# Patient Record
Sex: FEMALE | Race: WHITE | Employment: FULL TIME
[De-identification: names, ages, dates, MRNs, and addresses within clinical notes are randomized per-mention and may not be internally consistent; named-entity substitution may affect disease eponyms.]

---

## 2019-10-03 ENCOUNTER — RECORDS - HEALTHEAST (OUTPATIENT)
Dept: ADMINISTRATIVE | Facility: OTHER | Age: 29
End: 2019-10-03

## 2019-10-03 ENCOUNTER — OFFICE VISIT (OUTPATIENT)
Dept: FAMILY MEDICINE | Facility: CLINIC | Age: 29
End: 2019-10-03
Payer: COMMERCIAL

## 2019-10-03 VITALS
WEIGHT: 117.9 LBS | SYSTOLIC BLOOD PRESSURE: 120 MMHG | HEART RATE: 91 BPM | DIASTOLIC BLOOD PRESSURE: 68 MMHG | HEIGHT: 60 IN | BODY MASS INDEX: 23.15 KG/M2 | OXYGEN SATURATION: 100 % | TEMPERATURE: 98.4 F

## 2019-10-03 DIAGNOSIS — Z00.00 ROUTINE GENERAL MEDICAL EXAMINATION AT A HEALTH CARE FACILITY: Primary | ICD-10-CM

## 2019-10-03 DIAGNOSIS — Z12.4 SCREENING FOR MALIGNANT NEOPLASM OF CERVIX: ICD-10-CM

## 2019-10-03 DIAGNOSIS — L90.0 LICHEN SCLEROSUS: ICD-10-CM

## 2019-10-03 PROCEDURE — G0145 SCR C/V CYTO,THINLAYER,RESCR: HCPCS | Performed by: PHYSICIAN ASSISTANT

## 2019-10-03 PROCEDURE — 99385 PREV VISIT NEW AGE 18-39: CPT | Performed by: PHYSICIAN ASSISTANT

## 2019-10-03 RX ORDER — TRIAMCINOLONE ACETONIDE 1 MG/G
OINTMENT TOPICAL PRN
Qty: 30 G | Refills: 5 | Status: SHIPPED | OUTPATIENT
Start: 2019-10-03

## 2019-10-03 ASSESSMENT — ENCOUNTER SYMPTOMS
CHILLS: 0
JOINT SWELLING: 0
FEVER: 0
DYSURIA: 0
ARTHRALGIAS: 0
HEMATOCHEZIA: 0
PARESTHESIAS: 0
DIZZINESS: 0
FREQUENCY: 0
PALPITATIONS: 0
ABDOMINAL PAIN: 0
DIARRHEA: 0
HEARTBURN: 0
SHORTNESS OF BREATH: 0
EYE PAIN: 0
CONSTIPATION: 0
BREAST MASS: 0
COUGH: 0
NERVOUS/ANXIOUS: 0
MYALGIAS: 0
SORE THROAT: 0
HEADACHES: 0
NAUSEA: 0
WEAKNESS: 0
HEMATURIA: 0

## 2019-10-03 ASSESSMENT — MIFFLIN-ST. JEOR: SCORE: 1203.16

## 2019-10-03 NOTE — LETTER
October 10, 2019      Aisha Ramirez  [address] [address]    Dear Ms.James,      I am happy to inform you that your recent cervical cancer screening test (PAP smear) was normal.      Preventative screenings such as this help to ensure your health for years to come. You should repeat a pap smear in 3 years, unless otherwise directed.      You will still need to return to the clinic every year for your annual exam and other preventive tests.     If you have additional questions regarding this result, please call our registered nurse, Sharon at [phone].      Sincerely,      Maryellen Elias PA-C/yanelis

## 2019-10-03 NOTE — PROGRESS NOTES
SUBJECTIVE:   CC: Aisha Ramirez is an 28 year old woman who presents for preventive health visit.     Healthy Habits:     Getting at least 3 servings of Calcium per day:  Yes    Bi-annual eye exam:  Yes    Dental care twice a year:  NO    Sleep apnea or symptoms of sleep apnea:  None    Diet:  Regular (no restrictions)    Frequency of exercise:  2-3 days/week    Duration of exercise:  15-30 minutes    Taking medications regularly:  Yes    Barriers to taking medications:  None    Medication side effects:  None    PHQ-2 Total Score: 0    Additional concerns today:  No    Patient is a new patient.   Has lichen sclerosis using topical steroids. Doing well with this.   Mirena- due for replacement 2022    Patient gets flu shot through work.     Today's PHQ-2 Score:   PHQ-2 ( 1999 Pf[employer]) 10/3/2019   Q1: Little interest or pleasure in doing things 0   Q2: Feeling down, depressed or hopeless 0   PHQ-2 Score 0   Q1: Little interest or pleasure in doing things Not at all   Q2: Feeling down, depressed or hopeless Not at all   PHQ-2 Score 0       Abuse: Current or Past(Physical, Sexual or Emotional)- No  Do you feel safe in your environment? Yes    Social History     Tobacco Use     Smoking status: Never Smoker     Smokeless tobacco: Never Used   Substance Use Topics     Alcohol use: Yes     Alcohol/week: 0.0 standard drinks         Alcohol Use 10/3/2019   Prescreen: >3 drinks/day or >7 drinks/week? No       Reviewed orders with patient.  Reviewed health maintenance and updated orders accordingly - Yes  Labs reviewed in [facility]    Mammogram not appropriate for this patient based on age.    Pertinent mammograms are reviewed under the imaging tab.  History of abnormal Pap smear: NO - age 21-29 PAP every 3 years recommended     Reviewed and updated as needed this visit by clinical staff  Tobacco  Allergies  Meds  Problems  Med Hx  Surg Hx  Fam Hx  Soc Hx          Reviewed and updated as needed this visit by  "Provider  Tobacco  Allergies  Meds  Problems  Med Hx  Surg Hx  Fam Hx            Review of Systems   Constitutional: Negative for chills and fever.   HENT: Negative for congestion, ear pain, hearing loss and sore throat.    Eyes: Negative for pain and visual disturbance.   Respiratory: Negative for cough and shortness of breath.    Cardiovascular: Negative for chest pain, palpitations and peripheral edema.   Gastrointestinal: Negative for abdominal pain, constipation, diarrhea, heartburn, hematochezia and nausea.   Breasts:  Positive for tenderness. Negative for breast mass and discharge.   Genitourinary: Negative for dysuria, frequency, genital sores, hematuria, pelvic pain, urgency and vaginal discharge.   Musculoskeletal: Negative for arthralgias, joint swelling and myalgias.   Skin: Negative for rash.   Neurological: Negative for dizziness, weakness, headaches and paresthesias.   Psychiatric/Behavioral: Negative for mood changes. The patient is not nervous/anxious.         OBJECTIVE:   /68 (BP Location: Left arm, Patient Position: Chair, Cuff Size: Adult Regular)   Pulse 91   Temp 98.4  F (36.9  C) (Oral)   Ht 1.535 m (5' 0.43\")   Wt 53.5 kg (117 lb 14.4 oz)   SpO2 100%   Breastfeeding? No   BMI 22.70 kg/m    Physical Exam  GENERAL: healthy, alert and no distress  EYES: Eyes grossly normal to inspection, PERRL and conjunctivae and sclerae normal  HENT: ear canals and TM's normal, nose and mouth without ulcers or lesions  NECK: no adenopathy, no asymmetry, masses, or scars and thyroid normal to palpation  RESP: lungs clear to auscultation - no rales, rhonchi or wheezes  BREAST: normal without masses, tenderness or nipple discharge and no palpable axillary masses or adenopathy  CV: regular rate and rhythm, normal S1 S2, no S3 or S4, no murmur, click or rub, no peripheral edema and peripheral pulses strong  ABDOMEN: soft, nontender, no hepatosplenomegaly, no masses and bowel sounds normal   " "(female): normal female external genitalia, normal urethral meatus, vaginal mucosa pink, moist, well rugated, and normal cervix/adnexa/uterus without masses or discharge- IUD strings in place.   MS: no gross musculoskeletal defects noted, no edema  SKIN: no suspicious lesions or rashes  NEURO: Normal strength and tone, mentation intact and speech normal  PSYCH: mentation appears normal, affect normal/bright    Diagnostic Test Results:  none     ASSESSMENT/PLAN:       ICD-10-CM    1. Routine general medical examination at a health care facility Z00.00 Pap imaged thin layer screen reflex to HPV if ASCUS - recommend age 25 - 29   2. Screening for malignant neoplasm of cervix Z12.4 Pap imaged thin layer screen reflex to HPV if ASCUS - recommend age 25 - 29   3. Lichen sclerosus L90.0 triamcinolone (KENALOG) 0.1 % external ointment       COUNSELING:  Reviewed preventive health counseling, as reflected in patient instructions       Regular exercise       Healthy diet/nutrition       Contraception    Estimated body mass index is 22.7 kg/m  as calculated from the following:    Height as of this encounter: 1.535 m (5' 0.43\").    Weight as of this encounter: 53.5 kg (117 lb 14.4 oz).    Weight management plan: Discussed healthy diet and exercise guidelines     reports that she has never smoked. She has never used smokeless tobacco.      Counseling Resources:  ATP IV Guidelines  Pooled Cohorts Equation Calculator  Breast Cancer Risk Calculator  FRAX Risk Assessment  ICSI Preventive Guidelines  Dietary Guidelines for Americans, 2010  USDA's MyPlate  ASA Prophylaxis  Lung CA Screening    Maryellen Elias PA-C  [facility]    The above chart was reviewed.  The patient was not examined by me.  Duy Moore MD (supervising physician)  Family Medicine  [place] [facility] [place]      "

## 2019-10-08 LAB
COPATH REPORT: NORMAL
PAP: NORMAL

## 2020-01-15 ENCOUNTER — RECORDS - HEALTHEAST (OUTPATIENT)
Dept: ADMINISTRATIVE | Facility: OTHER | Age: 30
End: 2020-01-15

## 2020-01-15 ENCOUNTER — OFFICE VISIT (OUTPATIENT)
Dept: FAMILY MEDICINE | Facility: CLINIC | Age: 30
End: 2020-01-15
Payer: COMMERCIAL

## 2020-01-15 VITALS
TEMPERATURE: 97.9 F | HEIGHT: 61 IN | DIASTOLIC BLOOD PRESSURE: 71 MMHG | WEIGHT: 123.8 LBS | SYSTOLIC BLOOD PRESSURE: 115 MMHG | HEART RATE: 99 BPM | BODY MASS INDEX: 23.37 KG/M2

## 2020-01-15 DIAGNOSIS — M62.838 MUSCLE SPASM: Primary | ICD-10-CM

## 2020-01-15 PROCEDURE — 99213 OFFICE O/P EST LOW 20 MIN: CPT | Performed by: PHYSICIAN ASSISTANT

## 2020-01-15 RX ORDER — TIZANIDINE HYDROCHLORIDE 4 MG/1
4 CAPSULE, GELATIN COATED ORAL 3 TIMES DAILY PRN
Qty: 60 CAPSULE | Refills: 0 | Status: SHIPPED | OUTPATIENT
Start: 2020-01-15 | End: 2020-10-27

## 2020-01-15 ASSESSMENT — MIFFLIN-ST. JEOR: SCORE: 1226.18

## 2020-01-15 ASSESSMENT — PAIN SCALES - GENERAL: PAINLEVEL: MILD PAIN (3)

## 2020-01-15 NOTE — PROGRESS NOTES
Subjective     Aisha Ramirez is a 29 year old female who presents to clinic today for the following health issues:    HPI   Back Pain       Duration: PT had injury x3 years ago and has had issues that past x2 days        Specific cause: Pt stated the initial injury was due to work. But the last x2 days she stated that she had a headache; in which, she felt that it brought on the pain in her back. She does not recall any specific event or trauma that could have potentiated her back pain this time around.     Description:   Location of pain: low back both and middle of back that radiates outwards on both sides   Character of pain: spasms   Pain radiation:none  New numbness or weakness in legs, not attributed to pain:  no     Intensity: Currently 3/10, At its worst 6-7/10    History:   Pain interferes with job: No  History of back problems: no prior back problems  Any previous MRI or X-rays: None  Sees a specialist for back pain:  No  Therapies tried without relief: cold and heat    Alleviating factors:   Improved by: rest      Precipitating factors:  Worsened by: Lifting her hands above her head and/or Bending her neck         Accompanying Signs & Symptoms:  Risk of Fracture:  None  Risk of Cauda Equina:  None  Risk of Infection:  None  Risk of Cancer:  None  Risk of Ankylosing Spondylitis:  Onset at age <35, male, AND morning back stiffness. no     5mg Cyclobenzaprine, this was an old prescription and she stated that she is not sure if this really ever helped with her back pain. Took ibuprofen 400mg BID, helps, but wasn't really active at that time. Heat and Ice yesterday.   Has her at home exercises. Hasn't been doing them.       Reviewed and updated as needed this visit by Provider  Tobacco  Allergies  Meds  Problems  Med Hx  Surg Hx  Fam Hx         Review of Systems   ROS COMP: Constitutional, HEENT, cardiovascular, pulmonary, gi and gu systems are negative, except as otherwise noted.      Objective   "  /71 (BP Location: Left arm, Patient Position: Chair, Cuff Size: Adult Regular)   Pulse 99   Temp 97.9  F (36.6  C) (Oral)   Ht 1.537 m (5' 0.51\")   Wt 56.2 kg (123 lb 12.8 oz)   Breastfeeding No   BMI 23.77 kg/m    Body mass index is 23.77 kg/m .  Physical Exam   GENERAL: healthy, alert and no distress  MS: no gross musculoskeletal defects noted, no point specific tenderness upon palpation of patients back. Active back flexion elicits some mid back pain that radiates more laterally. Active neck flexion elicits paraspinal pain bilaterally. Otherwise AROM of back is intact. full range of motion of the bilateral shoulders.   NEURO: Normal strength and tone, mentation intact and speech normal, deep tendon reflexes intact    Diagnostic Test Results:  none         Assessment & Plan     1. Muscle spasm  -Based on the history and physical exam Aisha is having a flare up from her back injury three years ago. Because she didn't think the cyclobenzaprine 5mg was helpful we are going to try tizanidine. If the back pain presists with tizanidine and ibuprofen prn, then physical therapy is recommend.   - tiZANidine (ZANAFLEX) 4 MG capsule; Take 1 capsule (4 mg) by mouth 3 times daily as needed for muscle spasms  Dispense: 60 capsule; Refill: 0         Return in about 2 weeks (around 1/29/2020) for follow up only as needed if symptoms worsen.  Patient was seen by Og LI under the supervision of:  Maryellen Elias PA-C  [facility]  The student Og FELIX acted as a scribe and the encounter documented above was completely performed by myself and the documentation reflects the work I have performed today.   Maryellen Elias PA-C       The above chart was reviewed.  The patient was not examined by me.  Duy Moore MD (supervising physician)  Family Medicine  [place] [facility] [place]          "

## 2020-01-15 NOTE — PATIENT INSTRUCTIONS
Re-start our home exercises.     Ibuprofen 400mg-600mg every 6 hours as needed. Make sure to take with food.     Contact me in 2-3 weeks if not improving.

## 2020-06-09 ENCOUNTER — E-VISIT (OUTPATIENT)
Dept: FAMILY MEDICINE | Facility: CLINIC | Age: 30
End: 2020-06-09

## 2020-06-09 ENCOUNTER — RECORDS - HEALTHEAST (OUTPATIENT)
Dept: ADMINISTRATIVE | Facility: OTHER | Age: 30
End: 2020-06-09

## 2020-06-09 DIAGNOSIS — M54.50 ACUTE BILATERAL LOW BACK PAIN WITHOUT SCIATICA: ICD-10-CM

## 2020-06-09 DIAGNOSIS — M62.838 MUSCLE SPASM: Primary | ICD-10-CM

## 2020-06-09 PROCEDURE — 99421 OL DIG E/M SVC 5-10 MIN: CPT | Performed by: PHYSICIAN ASSISTANT

## 2020-06-09 RX ORDER — METHYLPREDNISOLONE 4 MG
TABLET, DOSE PACK ORAL
Qty: 21 TABLET | Refills: 0 | Status: SHIPPED | OUTPATIENT
Start: 2020-06-09 | End: 2021-02-01

## 2020-06-09 NOTE — TELEPHONE ENCOUNTER
Provider E-Visit time total (minutes): Initial 4 minutes.   Additional 5 minutes.   Maryellen Elias PA-C

## 2020-06-11 ENCOUNTER — VIRTUAL VISIT (OUTPATIENT)
Dept: PHYSICAL THERAPY | Facility: CLINIC | Age: 30
End: 2020-06-11
Attending: PHYSICIAN ASSISTANT

## 2020-06-11 DIAGNOSIS — M54.50 ACUTE BILATERAL LOW BACK PAIN WITHOUT SCIATICA: ICD-10-CM

## 2020-06-11 DIAGNOSIS — M62.838 MUSCLE SPASM: ICD-10-CM

## 2020-06-11 PROCEDURE — 97110 THERAPEUTIC EXERCISES: CPT | Mod: GT | Performed by: PHYSICAL THERAPIST

## 2020-06-11 PROCEDURE — 97161 PT EVAL LOW COMPLEX 20 MIN: CPT | Mod: GT | Performed by: PHYSICAL THERAPIST

## 2020-06-11 NOTE — LETTER
"[facility]  [address]  [address]  [place] [address]  [phone]    2020    Re: Aisha Ramirez   :   1990  MRN:  [id number]   REFERRING PHYSICIAN:   LUISA Smith PT  Date of Initial Evaluation:  2020  Visits:     Reason for Referral:     Muscle spasm  Acute bilateral low back pain without sciatica    EVALUATION SUMMARY    Physical Therapy Virtual Initial Visit  The patient has been notified of following:     \"This virtual visit will be conducted between you and your provider. We have found that certain health care needs can be provided without the need for physical presence.  This service lets us provide the care you need with a virtual visit.\"    Due to external, as well as internal [facility] management of the COVID-19 Virus, Aisha Ramirez was not seen in our clinic.  As a substitution, we implemented a virtual visit to manage this patient's condition utilizing the [employer] virtual visit platform via the patient s existing code.  The provider, Avinash Alcazar, reviewed the patient's chart, PTRx prescription, and spoke with the patient to determine the following telemedicine visit is appropriate and effective for the patient's care.    The following type of visit was completed:   Video Visit:  The [employer] platform uses a synchronous HIPAA compliant video stream for this patient encounter.       Subjective:  Therapist Generated HPI Evaluation  Problem details: Patient reports the onset of low back pain 2016 after taking a stretcher out of an ambulance. Patient reports having that injury treated with physical therapy at that time. Recently patient reports a worsening of low back pain in May 2020..         Type of problem:  Lumbar.    This is a chronic condition.  Condition occurred with:  Lifting.  Where condition occurred: for unknown reasons.  Patient reports pain:  Lower lumbar spine and lumbar spine right.  Pain is described as " "aching and burning and is constant.    Re: Aisha ANDREA Main   :   1990    Pain radiates to:  Gluteals left. Pain is worse during the day.  Since onset symptoms are unchanged.  Associated symptoms:  Loss of strength and loss of motion/stiffness. Symptoms are exacerbated by bending, lifting, sitting and carrying (bending to the right side exacerbates right sided pain)  and relieved by activity/movement and NSAID's (supine with legs elevated).  Imaging testing: none.  Previous treatment includes physical therapy. There was moderate improvement following previous treatment.  Restrictions due to condition include:  Working in normal job without restrictions (got a new job since the onset due to back pain).  Barriers include:  None as reported by patient.       Objective:  Standing Alignment:    Cervical/Thoracic:  Forward head  Shoulder/UE:  Rounded shoulders       Lumbar/SI Evaluation  ROM:    AROM Lumbar:   Flexion:            Finger tips to floor,  painful  Ext:                    Significant limitation   Side Bend:        Left:  Finger tip to knee, painful    Right:  Finger tip to knee, painful  Rotation:           Left:  90 deg \"pull\"    Right:  90 deg \"pull\"  Side Glide:        Left:     Right:         Lumbar Myotomes:  Lumbar myotomes: patient reports weakness of left glutes that causes a compensation lateral shift movment during squats.    Neural Tension/Mobility:    Left side:  SLR and Slump positive.   Right side:   Slump and SLR positive.    Lumbar Palpation:  Palpation (lumbar): self palpation.  Tenderness present at Left:    Erector Spinae; Piriformis and Gluteus Medius  Tenderness present at Right: Erector Spinae; Piriformis and Gluteus Medius    PTRx Content from today's visit:  Exercise Name: Standing Extension at Counter Supported, Sets: 3 - Reps: 10 - Sessions: every 2 hours or more if you notice that it helps  Exercise Name: Bridging #1, Sets: 3 - Reps: 15 - Sessions: 1-2 times per day, " Notes: brace your abdominal muscles, squeeze your buttocks and  lift your hips off the table.     Exercise Name: Supine Abdominal Exercise #3 (Marching), Sets: 3 - Reps: 15 marches each leg - Sessions: 1-2 times per day, Notes: brace your abdominals and flatten your back. Hold this position as you march one leg off the ground at a time.  Exercise Name: Prone Press Ups, Sets: 3 - Reps: 10  - Sessions: every 2 hours. , Notes: Another option for lumbar extension. Think about breathing out and letting your hips hang.    If the pain in your leg increases then stop this exercise. This includes the pain going further down your leg or increasing in intensity down your leg. If pain improves in your leg (in intensity or goes up your leg toward your spine) keep doing this exercise. Call me if you have questions.    Assessment/Plan:     Patient is a 29 year old female with lumbar complaints.    Patient has the following significant findings with corresponding treatment plan.                Diagnosis 1:  Low back pain  Pain -  hot/cold therapy, self management, education, directional preference exercise and home program  Decreased ROM/flexibility - manual therapy, therapeutic exercise, therapeutic activity and home program  Decreased strength - therapeutic exercise, therapeutic activities and home program  Decreased function - therapeutic activities and home program  Impaired posture - neuro re-education, therapeutic activities and home program    Therapy Evaluation Codes:   1) History comprised of:   Personal factors that impact the plan of care:      None.    Comorbidity factors that impact the plan of care are:      None.     Medications impacting care: None.  2) Examination of Body Systems comprised of:   Body structures and functions that impact the plan of care:      Lumbar spine.   Activity limitations that impact the plan of care are:      Bathing, Bending, Cooking, Dressing, Lifting, Reading/Computer work, Sitting and  Squatting/kneeling.  3) Clinical presentation characteristics are:   Stable/Uncomplicated.  4) Decision-Making    Low complexity using standardized patient assessment instrument and/or measureable assessment of functional outcome.  Cumulative Therapy Evaluation is: Low complexity.    Previous and current functional limitations:  (See Goal Flow Sheet for this information)    Short term and Long term goals: (See Goal Flow Sheet for this information)     Communication ability:  Patient appears to be able to clearly communicate and understand verbal and written communication and follow directions correctly.  Treatment Explanation - The following has been discussed with the patient:   RX ordered/plan of care  Anticipated outcomes  Possible risks and side effects  This patient would benefit from PT intervention to resume normal activities.   Rehab potential is good.    Frequency:  1 X week, once daily  Duration:  for 6 weeks  Discharge Plan:  Achieve all LTG.  Independent in home treatment program.  Reach maximal therapeutic benefit.    Re: Aisha ANDREA Main   :   1990    Please refer to the daily flowsheet for treatment today, total treatment time and time spent performing 1:1 timed codes.     Virtual visit contact time  Time of service began: 12:30 PM  Time of service ended: 1:20 PM  Total Time for set up, visit, and documentation: 60 minutes    Payor: PREFERREDONE / Plan: PREFERRED[employer] HMO / Product Type: PPO /     Procedure Code/s   Therapeutic Exercise (16656): 24 minutes    I have reviewed the note as documented above.  This accurately captures the substance of my conversation with the patient.  Provider location: [place] ([facility])  Patient location: Home    ___________________________________________________  Re: Aisha ANDREA Main   :   1990    Thank you for your referral.    INQUIRIES  Therapist: KIRSTEN Fong PT  [address]  [address]  [place]  [address]  Phone: [phone]  Fax: [phone]

## 2020-06-11 NOTE — PROGRESS NOTES
"Physical Therapy Virtual Initial Visit      The patient has been notified of following:     \"This virtual visit will be conducted between you and your provider. We have found that certain health care needs can be provided without the need for physical presence.  This service lets us provide the care you need with a virtual visit.\"    Due to external, as well as internal [facility] management of the COVID-19 Virus, Aisha Ramirez was not seen in our clinic.  As a substitution, we implemented a virtual visit to manage this patient's condition utilizing the [employer] virtual visit platform via the patient s existing code.  The provider, Avinash Alcazar, reviewed the patient's chart, PTRx prescription, and spoke with the patient to determine the following telemedicine visit is appropriate and effective for the patient's care.    The following type of visit was completed:   Video Visit:  The [employer] platform uses a synchronous HIPAA compliant video stream for this patient encounter.         Subjective:    Therapist Generated HPI Evaluation  Problem details: Patient reports the onset of low back pain April 2016 after taking a stretcher out of an ambulance. Patient reports having that injury treated with physical therapy at that time. Recently patient reports a worsening of low back pain in May 2020..         Type of problem:  Lumbar.    This is a chronic condition.  Condition occurred with:  Lifting.  Where condition occurred: for unknown reasons.  Patient reports pain:  Lower lumbar spine and lumbar spine right.  Pain is described as aching and burning and is constant.  Pain radiates to:  Gluteals left. Pain is worse during the day.  Since onset symptoms are unchanged.  Associated symptoms:  Loss of strength and loss of motion/stiffness. Symptoms are exacerbated by bending, lifting, sitting and carrying (bending to the right side exacerbates right sided pain)  and relieved by activity/movement and NSAID's (supine " "with legs elevated).  Imaging testing: none.  Previous treatment includes physical therapy. There was moderate improvement following previous treatment.  Restrictions due to condition include:  Working in normal job without restrictions (got a new job since the onset due to back pain).  Barriers include:  None as reported by patient.                  Objective:    Standing Alignment:    Cervical/Thoracic:  Forward head  Shoulder/UE:  Rounded shoulders                           Lumbar/SI Evaluation  ROM:    AROM Lumbar:   Flexion:            Finger tips to floor,  painful  Ext:                    Significant limitation   Side Bend:        Left:  Finger tip to knee, painful    Right:  Finger tip to knee, painful  Rotation:           Left:  90 deg \"pull\"    Right:  90 deg \"pull\"  Side Glide:        Left:     Right:           Lumbar Myotomes:  Lumbar myotomes: patient reports weakness of left glutes that causes a compensation lateral shift movment during squats.                  Neural Tension/Mobility:    Left side:  SLR and Slump positive.   Right side:   Slump and SLR positive.  Lumbar Palpation:  Palpation (lumbar): self palpation.  Tenderness present at Left:    Erector Spinae; Piriformis and Gluteus Medius  Tenderness present at Right: Erector Spinae; Piriformis and Gluteus Medius                                                   General   ROS    PTRx Content from today's visit:  Exercise Name: Standing Extension at Counter Supported, Sets: 3 - Reps: 10 - Sessions: every 2 hours or more if you notice that it helps  Exercise Name: Bridging #1, Sets: 3 - Reps: 15 - Sessions: 1-2 times per day, Notes: brace your abdominal muscles, squeeze your buttocks and  lift your hips off the table.     Exercise Name: Supine Abdominal Exercise #3 (Marching), Sets: 3 - Reps: 15 marches each leg - Sessions: 1-2 times per day, Notes: brace your abdominals and flatten your back. Hold this position as you march one leg off the ground " at a time.  Exercise Name: Prone Press Ups, Sets: 3 - Reps: 10  - Sessions: every 2 hours. , Notes: Another option for lumbar extension. Think about breathing out and letting your hips hang.    If the pain in your leg increases then stop this exercise. This includes the pain going further down your leg or increasing in intensity down your leg. If pain improves in your leg (in intensity or goes up your leg toward your spine) keep doing this exercise. Call me if you have questions.    Assessment/Plan:     Patient is a 29 year old female with lumbar complaints.    Patient has the following significant findings with corresponding treatment plan.                Diagnosis 1:  Low back pain  Pain -  hot/cold therapy, self management, education, directional preference exercise and home program  Decreased ROM/flexibility - manual therapy, therapeutic exercise, therapeutic activity and home program  Decreased strength - therapeutic exercise, therapeutic activities and home program  Decreased function - therapeutic activities and home program  Impaired posture - neuro re-education, therapeutic activities and home program    Therapy Evaluation Codes:   1) History comprised of:   Personal factors that impact the plan of care:      None.    Comorbidity factors that impact the plan of care are:      None.     Medications impacting care: None.  2) Examination of Body Systems comprised of:   Body structures and functions that impact the plan of care:      Lumbar spine.   Activity limitations that impact the plan of care are:      Bathing, Bending, Cooking, Dressing, Lifting, Reading/Computer work, Sitting and Squatting/kneeling.  3) Clinical presentation characteristics are:   Stable/Uncomplicated.  4) Decision-Making    Low complexity using standardized patient assessment instrument and/or measureable assessment of functional outcome.  Cumulative Therapy Evaluation is: Low complexity.    Previous and current functional limitations:   (See Goal Flow Sheet for this information)    Short term and Long term goals: (See Goal Flow Sheet for this information)     Communication ability:  Patient appears to be able to clearly communicate and understand verbal and written communication and follow directions correctly.  Treatment Explanation - The following has been discussed with the patient:   RX ordered/plan of care  Anticipated outcomes  Possible risks and side effects  This patient would benefit from PT intervention to resume normal activities.   Rehab potential is good.    Frequency:  1 X week, once daily  Duration:  for 6 weeks  Discharge Plan:  Achieve all LTG.  Independent in home treatment program.  Reach maximal therapeutic benefit.    Please refer to the daily flowsheet for treatment today, total treatment time and time spent performing 1:1 timed codes.       Virtual visit contact time    Time of service began: 12:30 PM  Time of service ended: 1:20 PM  Total Time for set up, visit, and documentation: 60 minutes    Payor: PREFERREDONE / Plan: PREFERREDONE HMO / Product Type: PPO /     Procedure Code/s   Therapeutic Exercise (86755): 24 minutes    I have reviewed the note as documented above.  This accurately captures the substance of my conversation with the patient.  Provider location: [place] ([facility]/State)  Patient location: Home    ___________________________________________________

## 2020-06-17 ENCOUNTER — VIRTUAL VISIT (OUTPATIENT)
Dept: PHYSICAL THERAPY | Facility: CLINIC | Age: 30
End: 2020-06-17

## 2020-06-17 DIAGNOSIS — M54.50 ACUTE BILATERAL LOW BACK PAIN WITHOUT SCIATICA: Primary | ICD-10-CM

## 2020-06-17 PROCEDURE — 97110 THERAPEUTIC EXERCISES: CPT | Mod: GT | Performed by: PHYSICAL THERAPIST

## 2020-06-17 NOTE — PROGRESS NOTES
"Physical Therapy Virtual Follow Up Visit      The patient has been notified of following:     \"This virtual visit will be conducted between you and your provider. We have found that certain health care needs can be provided without the need for physical presence.  This service lets us provide the care you need with a virtual visit.\"    Due to external, as well as internal [facility] management of the COVID-19 Virus, Aisha Ramirez was not seen in our clinic.  As a substitution, we implemented a virtual visit to manage this patient's condition utilizing the PTRx virtual visit platform via the patient s existing code.  The provider, Avinash Alcazar, reviewed the patient's chart, PTRx prescription, and spoke with the patient to determine the following telemedicine visit is appropriate and effective for the patient's care.    The following type of visit was completed:   Video Visit:  The [employer] platform uses a synchronous HIPAA compliant video stream for this patient encounter.        S: Aisha Ramirez is a 29 year old female. Connected virtually on the PTRx platform to discuss their condition/progress. They noted improvements in lateral hip pain.  They noted ongoing pain or limitations with anterior hip pain.     Current pain level: 3/10  Patient reports doing better overall, but has had a couple exacerbations of pain since last PT session. She reports an improvement in AROM of her low back. Patient also reports starting to take oral steroids yesterday prednisone.     O: Patient demonstrated Low back pain centralized with repeated press ups with lock sag cues     PTRx Content from today's visit:  Exercise Name: Standing Extension at Counter Supported, Sets: 3 - Reps: 10 - Sessions: every 2 hours or more if you notice that it helps  Exercise Name: Prone Press Ups, Sets: 3 - Reps: 10  - Sessions: every 2 hours. , Notes: Another option for lumbar extension. Think about breathing out and letting your hips " hang.    If the pain in your leg increases then stop this exercise. This includes the pain going further down your leg or increasing in intensity down your leg. If pain improves in your leg (in intensity or goes up your leg toward your spine) keep doing this exercise. Call me if you have questions.  Exercise Name: Education Sheet Lumbar, Notes: Gus Alcazar Aminta [email or web address] [phone]    Exercise Name: Supine Abdominal Exercise #3B (Two Leg Marching), Sets: 3 - Reps: 20 lifts each leg - Sessions: 1  Exercise Name: Single Leg Bridge, Sets: 3 - Reps: 15 each leg - Sessions: 1, Notes: Keep your hips level  Exercise Name: Prone Plank, Sets: 2 - Reps: hold to fatigue - Sessions: daily    A:   Patient is progressing as expected.  Response to therapy has shown an improvement in  pain level      P: Patient will continue with the exercise program assigned on their PTRx code and will add the following measures to manage their pain/condition:      Current treatment program is being advanced to more complex exercises.      Virtual visit contact time    Time of service began: 9:00 AM  Time of service ended: 9:40 AM  Total Time for set up, visit, and documentation: 45 minutes    Payor: PREFERREDONE / Plan: PREFERREDONE HMO / Product Type: PPO /   .  Procedure Code/s   Therapeutic Exercise (64914): 30 minutes    I have reviewed the note as documented above.  This accurately captures the substance of my conversation with the patient.  Provider location: [place] ([facility])  Patient location: [place]

## 2020-06-25 ENCOUNTER — THERAPY VISIT (OUTPATIENT)
Dept: PHYSICAL THERAPY | Facility: CLINIC | Age: 30
End: 2020-06-25
Payer: COMMERCIAL

## 2020-06-25 DIAGNOSIS — M54.50 ACUTE BILATERAL LOW BACK PAIN WITHOUT SCIATICA: Primary | ICD-10-CM

## 2020-06-25 PROCEDURE — 97110 THERAPEUTIC EXERCISES: CPT | Mod: GP | Performed by: PHYSICAL THERAPIST

## 2020-07-06 ENCOUNTER — THERAPY VISIT (OUTPATIENT)
Dept: PHYSICAL THERAPY | Facility: CLINIC | Age: 30
End: 2020-07-06
Payer: COMMERCIAL

## 2020-07-06 DIAGNOSIS — M54.50 ACUTE BILATERAL LOW BACK PAIN WITHOUT SCIATICA: Primary | ICD-10-CM

## 2020-07-06 PROCEDURE — 97110 THERAPEUTIC EXERCISES: CPT | Mod: GP | Performed by: PHYSICAL THERAPIST

## 2020-07-06 PROCEDURE — 97012 MECHANICAL TRACTION THERAPY: CPT | Mod: GP | Performed by: PHYSICAL THERAPIST

## 2020-07-06 PROCEDURE — 97140 MANUAL THERAPY 1/> REGIONS: CPT | Mod: GP | Performed by: PHYSICAL THERAPIST

## 2020-08-12 ENCOUNTER — THERAPY VISIT (OUTPATIENT)
Dept: PHYSICAL THERAPY | Facility: CLINIC | Age: 30
End: 2020-08-12
Payer: COMMERCIAL

## 2020-08-12 DIAGNOSIS — M54.50 ACUTE BILATERAL LOW BACK PAIN WITHOUT SCIATICA: Primary | ICD-10-CM

## 2020-08-12 PROCEDURE — 97012 MECHANICAL TRACTION THERAPY: CPT | Mod: GP | Performed by: PHYSICAL THERAPIST

## 2020-08-12 PROCEDURE — 97110 THERAPEUTIC EXERCISES: CPT | Mod: GP | Performed by: PHYSICAL THERAPIST

## 2020-08-12 PROCEDURE — 97530 THERAPEUTIC ACTIVITIES: CPT | Mod: GP | Performed by: PHYSICAL THERAPIST

## 2020-10-27 ENCOUNTER — TELEPHONE (OUTPATIENT)
Dept: FAMILY MEDICINE | Facility: CLINIC | Age: 30
End: 2020-10-27

## 2020-10-27 DIAGNOSIS — M62.838 MUSCLE SPASM: ICD-10-CM

## 2020-10-27 RX ORDER — TIZANIDINE HYDROCHLORIDE 4 MG/1
4 CAPSULE, GELATIN COATED ORAL 3 TIMES DAILY PRN
Qty: 60 CAPSULE | Refills: 0 | Status: SHIPPED | OUTPATIENT
Start: 2020-10-27 | End: 2021-05-11 | Stop reason: ALTCHOICE

## 2020-10-27 NOTE — TELEPHONE ENCOUNTER
Reason for Call:  Medication or medication refill:    Do you use a [facility]?  Name of the pharmacy and phone number for the current request:  New Pharmacy:  [facility], [address] [place] [address]. [phone]    Name of the medication requested: tiZANidine (ZANAFLEX) 4 MG capsule    Other request:     Can we leave a detailed message on this number? YES    Phone number patient can be reached at: Home number on file [phone] (home)    Best Time: any    Call taken on 10/27/2020 at 10:00 AM by Bernadette Todd

## 2020-10-27 NOTE — TELEPHONE ENCOUNTER
Tizanidine refill    Routing refill request to provider for review/approval because:  Drug not on the [facility] refill protocol   Jocelyn Prasad RN  [facility]

## 2020-12-03 ENCOUNTER — THERAPY VISIT (OUTPATIENT)
Dept: PHYSICAL THERAPY | Facility: CLINIC | Age: 30
End: 2020-12-03
Payer: COMMERCIAL

## 2020-12-03 DIAGNOSIS — M54.50 ACUTE BILATERAL LOW BACK PAIN WITHOUT SCIATICA: Primary | ICD-10-CM

## 2020-12-03 PROCEDURE — 97140 MANUAL THERAPY 1/> REGIONS: CPT | Mod: GP | Performed by: PHYSICAL THERAPIST

## 2020-12-03 PROCEDURE — 97012 MECHANICAL TRACTION THERAPY: CPT | Mod: GP | Performed by: PHYSICAL THERAPIST

## 2020-12-03 NOTE — PROGRESS NOTES
Subjective:  HPI  Physical Exam                    Objective:  System    Physical Exam    General     ROS    Assessment/Plan:    PROGRESS  REPORT/DISCHARGE REPORT    Progress reporting period is from 8/12/2020 to 12/3/2020.       SUBJECTIVE  Subjective changes noted by patient:   Subjective: Patient returns to PT with continued complaints of low back pain. Patient reports left sided low back pain that no longer radiates down her leg. Patient reports full compliance with her exsercises and is managing her left hip joint pain well with the prescribed hip mobilizations. She reports not tolerating being in any position for a long period of time. She is able to sit, stand and even lay down while working which does help her manage the low back discomfort during the day. Patient reports participating in workout videos as well which does improve or even, at times, resolve her low back pain. As a result, she works out regularly.  Patient reports her pain level can increase up to 4/10 at its worst and is a 3/10 on average. She reports being able to complete all of her daily tasks without her back distracting or limiting her participation. Patient reports a significant improvment in low back pain during and following lumbar traction. She would like to proceed with obtaining her own home unit to allow her to manage her symptoms independantly at home.     Current pain level is  Current Pain level: 0/10.     Previous pain level was   Initial Pain level: 5/10.   Changes in function:  Yes (See Goal flowsheet attached for changes in current functional level)  Adverse reaction to treatment or activity: None    OBJECTIVE  Changes noted in objective findings:  Yes,   Objective: Lumbar spine AROM WNL throughout and pain free.  Lumbar myotomes grossly 5/5 bilaterally. +upslip on right innominate. Resolution of low back pain following todays manual techniques and lumbar traction.    ASSESSMENT/PLAN  Updated problem list and treatment  plan: Diagnosis 1:  Acute back pain  Pain -  hot/cold therapy, mechanical traction, manual therapy, self management, education and home program  STG/LTGs have been met or progress has been made towards goals:  Yes (See Goal flow sheet completed today.)  Assessment of Progress: The patient's condition is improving.  The patient has met all of their long term goals.  Self Management Plans:  Patient is independent in a home treatment program.  Patient is independent in self management of symptoms.  I have re-evaluated this patient and find that the nature, scope, duration and intensity of the therapy is appropriate for the medical condition of the patient.  Aisha continues to require the following intervention to meet STG and LTG's:  PT intervention is no longer required to meet STG/LTG.    Recommendations:  This patient is ready to be discharged from therapy and continue their home treatment program. Patient will return to PT if her condition does not improve or worsens.    Please refer to the daily flowsheet for treatment today, total treatment time and time spent performing 1:1 timed codes.

## 2020-12-04 DIAGNOSIS — M54.50 ACUTE BILATERAL LOW BACK PAIN WITHOUT SCIATICA: Primary | ICD-10-CM

## 2020-12-27 ENCOUNTER — HEALTH MAINTENANCE LETTER (OUTPATIENT)
Age: 30
End: 2020-12-27

## 2021-01-21 ENCOUNTER — TELEPHONE (OUTPATIENT)
Dept: PHYSICAL THERAPY | Facility: CLINIC | Age: 31
End: 2021-01-21

## 2021-01-21 ENCOUNTER — E-VISIT (OUTPATIENT)
Dept: FAMILY MEDICINE | Facility: CLINIC | Age: 31
End: 2021-01-21
Payer: COMMERCIAL

## 2021-01-21 DIAGNOSIS — M54.50 ACUTE BILATERAL LOW BACK PAIN WITHOUT SCIATICA: Primary | ICD-10-CM

## 2021-01-21 PROCEDURE — 99207 PR NON-BILLABLE SERV PER CHARTING: CPT | Performed by: PHYSICIAN ASSISTANT

## 2021-01-21 NOTE — TELEPHONE ENCOUNTER
Nany called with an update on her condition. She stated that she is feeling strong, more flexible and more active, but her low back pain is not changing.     She was encouraged to follow up with her primary care provider for a potential referral to a specialist and or imaging

## 2021-01-22 NOTE — TELEPHONE ENCOUNTER
Provider E-Visit time total (minutes): no charge recommended in office appointment.   Maryellen Elias PA-C

## 2021-02-01 ENCOUNTER — OFFICE VISIT (OUTPATIENT)
Dept: FAMILY MEDICINE | Facility: CLINIC | Age: 31
End: 2021-02-01
Payer: COMMERCIAL

## 2021-02-01 VITALS
SYSTOLIC BLOOD PRESSURE: 121 MMHG | HEIGHT: 61 IN | BODY MASS INDEX: 22.96 KG/M2 | WEIGHT: 121.6 LBS | TEMPERATURE: 98.5 F | OXYGEN SATURATION: 100 % | DIASTOLIC BLOOD PRESSURE: 76 MMHG | HEART RATE: 92 BPM

## 2021-02-01 DIAGNOSIS — M54.50 ACUTE BILATERAL LOW BACK PAIN WITHOUT SCIATICA: Primary | ICD-10-CM

## 2021-02-01 PROCEDURE — 99214 OFFICE O/P EST MOD 30 MIN: CPT | Performed by: PHYSICIAN ASSISTANT

## 2021-02-01 ASSESSMENT — PAIN SCALES - GENERAL: PAINLEVEL: MILD PAIN (3)

## 2021-02-01 ASSESSMENT — MIFFLIN-ST. JEOR: SCORE: 1212.33

## 2021-02-01 NOTE — PROGRESS NOTES
Assessment & Plan     Acute bilateral low back pain without sciatica  All started with work injury. Will continue tizanidine and get MRI at this time as not improving and has not had one.   - MR Lumbar Spine w/o Contrast; Future                             No follow-ups on file.    LUISA Smith [facility] BERNARD Leonard is a 30 year old who presents to clinic today for the following health issues     HPI       Back Pain  Onset/Duration: Since 2016  Description:   Location of pain: low back bilateral  Character of pain: burning; constant  Pain radiation: radiates into the left buttocks  New numbness or weakness in legs, not attributed to pain: no   Intensity: Currently 3/10, At its worst 6-7/10  Progression of Symptoms: same  History:   Specific cause: Work comp  Pain interferes with job: YES  History of back problems: no prior back problems  Any previous MRI or X-rays: Yes--  Sees a specialist for back pain: P.T. in [place]  Alleviating factors:   Improved by: laying down and medications    Precipitating factors:  Worsened by: Sitting and strenuous activity   Therapies tried and outcome: acetaminophen (Tylenol), Physical Therapy, rest and stretch    Accompanying Signs & Symptoms:  Risk of Fracture: None  Risk of Cauda Equina: None  Risk of Infection: None  Risk of Cancer: None  Risk of Ankylosing Spondylitis: Onset at age <35, male, AND morning back stiffness  no       Has always been a roller coaster of flares.   Last year it had been increasingly worse.   Physical therapy was helping. Is more flexible.   Still chronic pain. The tizanidine does help. Tries not to use it often, does make her tired.   Ibuprofen does help as well.   No new numbness/tingling. When she started physical therapy she did have some sciatica but has improved.   Mostly burning pain in the low back. Feels like if something popped it would all be better. Pain with back extension.  Tried medrol  "dose pack last year without any significant change.       Review of Systems         Objective    /76 (BP Location: Left arm, Patient Position: Chair, Cuff Size: Adult Regular)   Pulse 92   Temp 98.5  F (36.9  C) (Oral)   Ht 1.539 m (5' 0.58\")   Wt 55.2 kg (121 lb 9.6 oz)   SpO2 100%   Breastfeeding No   BMI 23.29 kg/m    Body mass index is 23.29 kg/m .  Physical Exam   GENERAL: healthy, alert and no distress  MS: no gross musculoskeletal defects noted, no edema- negative straight leg raise bilaterally. full range of motion of the back, but pain with extension and rotation.   SKIN: no suspicious lesions or rashes  NEURO: Normal strength and tone, mentation intact and speech normal, deep tendon reflexes intact.         \plain        "

## 2021-03-01 ENCOUNTER — HOSPITAL ENCOUNTER (OUTPATIENT)
Dept: MRI IMAGING | Facility: CLINIC | Age: 31
Discharge: HOME | End: 2021-03-01
Attending: PHYSICIAN ASSISTANT | Admitting: PHYSICIAN ASSISTANT
Payer: COMMERCIAL

## 2021-03-01 DIAGNOSIS — M54.50 ACUTE BILATERAL LOW BACK PAIN WITHOUT SCIATICA: ICD-10-CM

## 2021-03-01 PROCEDURE — 72148 MRI LUMBAR SPINE W/O DYE: CPT

## 2021-03-01 NOTE — RESULT ENCOUNTER NOTE
Aisha,     Your MRI was completely normal. At this time I think our next step is for you to see our pain management team to investigate ways to help with the pain. Let me know if you are interested and I will placed the referral.   Maryellen Elias PA-C

## 2021-03-28 ENCOUNTER — MYC MEDICAL ADVICE (OUTPATIENT)
Dept: FAMILY MEDICINE | Facility: CLINIC | Age: 31
End: 2021-03-28

## 2021-03-28 DIAGNOSIS — M54.50 ACUTE BILATERAL LOW BACK PAIN WITHOUT SCIATICA: Primary | ICD-10-CM

## 2021-04-22 ENCOUNTER — TELEPHONE (OUTPATIENT)
Dept: PALLIATIVE MEDICINE | Facility: CLINIC | Age: 31
End: 2021-04-22

## 2021-04-22 NOTE — TELEPHONE ENCOUNTER

## 2021-05-10 NOTE — PROGRESS NOTES
Aisha is a 30 year old who is being evaluated via a billable video visit.      How would you like to obtain your AVS? MyChart  If the video visit is dropped, the invitation should be resent by: Other e-mail: Susan  Will anyone else be joining your video visit? DELLA Barrientos [facility]    Video Start Time: 12:47 PM       [facility]     Date of visit: 5/11/2021    Assessment:  Aisha Ramirez is a 30 year old female with a past medical history significant for lichen sclerosus who presents with complaints of low back pain .     1. Low back pain- etiology unclear, no abnormalities noted on imaging.   2. Mental Health - the patient's mental health concerns, specifically situational depression, affect her experience of pain and contribute to her clinically significant distress.    1. Chronic bilateral low back pain without sciatica    2. Muscle spasm        Plan:  The following recommendations were given to the patient. Diagnosis, treatment options, risks, benefits, and alternatives were discussed, and all questions were answered. The patient expressed understanding of the plan for management.     I am recommending a multidisciplinary treatment plan to help this patient better manage her pain.  This includes:    1.  Pain Physical Therapy:     YES  Nany has complete three rounds of physical therapy already with improvement in strength and ROM. She has not yet tried a chronic pain approach and I think that she would benefit. She is interested. Order placed. Schedule first visit with Morenita Lou PT and try to have 2-3 sessions prior to next visit.   2.  Pain Psychologist to address relaxation, behavioral change, coping style, and other factors important to improvement.     May consider in the future but hold off on for now.    3.  Medication Management:     1. Okay to continue ibuprofen and Tylenol as needed as these medications have been somewhat  helpful.      2. Nany prefers to limit the role of medication management if possible. Tizanidine has been somewhat effective but sedating. Advised she discontinue tizanidine and try Robaxin. Take 500-1000mg up to QID prn. Monitor benefit.    3. If Robaxin doesn't provide significant pain relief we will likely consider Cymbalta as an option.      4.  Potential procedures: not at this time. May consider trigger point injections in the future.     5.  Referrals: I would recommend chiropractic care and acupuncture for a holistic approach to management of pain. Call to check coverage with insurance. See resources below.    6.  Follow up with MELVA Gibbons CNP in 4 weeks.     Review of Electronic Chart: Today I have also reviewed available medical information in the patient's medical record at [place] ([facility]), including relevant provider notes, laboratory work, and imaging.       MELVA Gibbons CNP  [facility]       -------------------------------------------------    Subjective:    Reason for consultation:    Aisha Ramirez is a 30 year old female who is seen in consultation today at the request of her PCP,  Maryellen Elias for evaluation of her pain issues and recommendations for management, with specific emphasis on  My Clinical Question Is: Chronic back pain without MRI findings.    Timeframe Requested: Routine: Next available opening    Priority: Routine    Reason for Referral: Evaluation for Comprehensive Services      Please see the [facility] [place] health questionnaire which the patient completed and reviewed with me in detail.    Review of [place] Prescription Monitoring Program (): No concern for abuse or misuse of controlled medications based on this report.     Review of Electronic Chart: Today I have also reviewed available medical information in the patient's medical record at [place] ([facility]), including relevant provider notes, laboratory work, and  imaging.     Pain medications are being prescribed by NA.     Chief Complaint:    Chief Complaint   Patient presents with     Pain       Pain history:  Aisha Ramirez is a 30 year old female who presents for initial evaluation of chief complaint of low back pain.      She first started having problems with low back pain in June of 2016. 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 Her back pain has persisted since that time. She tried a Medrol dose pack. She completed physical therapy without significant benefit.  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 She completed another round of more intense physical therapy with increased strength but continued to have persistent pain. 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 Her pain has gradually worsened over the last year. She completed a third round off physical therapy in 2020 with improvement in flexibility and strength. She continues a muscle relaxant with some benefit but has sedation with this. The pain is located in bilateral low back, left > right. The pain has radiated into her legs previously (down low back into her tailbone) but no longer does. Denies weakness.     Pain description:  Location: low back  Quality: burning  Severity/Intensity: 3/10 at best, 8/10 at worst, 3-4/10 on average  Aggravating factors include: sitting, quick movements/twisting  Relieving factors include: laying down, stretches, exercise    The patient otherwise denies bowel or bladder incontinence, parasthesias, weakness, saddle anesthesia, unintentional weight loss, or fever/chills/sweats.     Aisha Ramirez has not been seen at a pain clinic in the past.      Pain Treatments:  (H--helped; HI--Helped initially; SWH--Somewhat helpful; NH--No help; W--worse; SE--side effects; ?--Unsure  if helpful)   1. Medications:       Current pain medications:   Tizanidine 4mg TID prn- [facility], usually takes about once a week, SE, drowsiness   Ibuprofen 800mg prn- [facility], sometimes daily while at work    Tylenol 1000mg prn- [facility], sometimes daily while at work     Current calculated MME: 0    1. Previous Pain Relevant Medications:  NOTE: This medication information taken from patient's intake form, not medical records.    Opiates: no   NSAIDS: ibuprofen- [facility]    Muscle Relaxants: tizanidine- [facility]   Anti-migraine mediations: no   Anti-depressants: no   Sleep aids: no   Anxiolytics: no   Neuropathics: no    Topicals: no   Other medications not covered above: Tylenol-     2. Physical Therapy: x3 rounds of AD physical therapy- [facility] in strength/flexibility, foam rolling  3. Pain Psychology: no  4. Surgery: no  5. Injections: no  6. Chiropractic: no  7. Acupuncture: no  8. TENS Unit: yes- ?    Past Medical History:  Past Medical History:   Diagnosis Date     Lichen sclerosus        Past Surgical History:  History reviewed. No pertinent surgical history.    Medications:  Current Outpatient Medications   Medication Sig Dispense Refill     levonorgestrel (MIRENA) 20 MCG/24HR IUD 1 each (20 mcg) by Intrauterine route once for 1 dose 1 each 0     methocarbamol (ROBAXIN) 500 MG tablet Take 1-2 tablets (500-1,000 mg) by mouth 3 times daily as needed for muscle spasms 90 tablet 1     triamcinolone (KENALOG) 0.1 % external ointment Apply topically as needed for irritation Once to twice weekly 30 g 5       Allergies:   No Known Allergies    Social History:  Home situation: lives in a townhouse  Support system: roommate, boyfriend  Occupation/Schooling: works for dispatch  Tobacco use: no  Drug use: no  Alcohol use: not often, glass of wine once in a while  History of chemical dependency treatment: no  Mental health admissions: no    Family history:  Family History   Problem Relation Age of Onset     Mental Illness Mother      Thyroid  Disease Mother         hypothyroid     Osteopenia Mother      Mental Illness Father      Heart Disease Father         Mitral valve replacement     Hypertension Father      Colitis Father      Seizure Disorder Father        Review of Systems:    POSTIVE IN BOLD  GENERAL: fever/chills, fatigue, general unwell feeling, weight gain/loss.  HEAD/EYES:  headache, dizziness, or vision changes.    EARS/NOSE/THROAT: nosebleeds, hearing loss, sinus infection, earache, tinnitus.  IMMUNE:  allergies, cancer, immune deficiency, or infections.  SKIN:  itching, rash, hives  HEME/Lymphatic: anemia, easy bruising, easy bleeding.  RESPIRATORY: cough, wheezing, or shortness of breath  CARDIOVASCULAR/Circulation: extremity edema, syncope, hypertension, tachycardia, or angina.  GASTROINTESTINAL: abdominal pain, nausea/emesis, diarrhea, constipation,  hematochezia, or melena.  ENDOCRINE:  diabetes, steroid use,  thyroid disease or osteoporosis.  MUSCULOSKELETAL: joint pain, stiffness, neck pain, back pain, arthritis, or gout.  GENITOURINARY: frequency, urgency, dysuria, difficulty voiding, hematuria or incontinence.  NEUROLOGIC: weakness, numbness, paresthesias, seizure, tremor, stroke or memory loss.  PSYCHIATRIC: depression, anxiety, stress, suicidal thoughts or mood swings.     Objective:    Imaging:  MRI of lumbar spine was completed on 3/1/2021 and shows:  T12-L1: Normal.     L1-L2: Normal.     L2-L3: Normal.     L3-L4: Normal.     L4-L5: Normal.     L5-S1: Normal.                                                                      IMPRESSION: Normal lumbar spine MRI examination.    Physical Exam:  There were no vitals filed for this visit.  Exam:  Constitutional: Well developed, well nourished, appears stated age.  HEENT: Head atraumatic, normocephalic. Eyes without conjunctival injection or jaundice. Oropharynx clear. Neck supple. No obvious neck masses.  Skin: No rash, lesions, or petechiae of exposed skin.   Extremities:  Peripheral pulses intact. No clubbing, cyanosis, or edema.  Psychiatric/mental status: Alert, without lethargy or stupor. Speech fluent. Appropriate affect. Mood normal. Able to follow commands without difficulty.     Musculoskeletal exam:  Patient does not have an antalgic gait.   Normal bulk and tone. Unremarkable spinal curvature.    Cervical spine:  Range of motion within normal limits.  Rotation/ext to right: pain free  Rotation/ext to left: pain free    Thoracic spine:    Kyphosis. No    Lumbar spine:    Flex: 150 degrees   Ext: 40 degrees   Rotation/ext to right: pain free   Rotation/ext to left: pain free        Video-Visit Details    Type of service:  Video Visit    Video End Time:1:25 PM    Originating Location (pt. Location): Home    Distant Location (provider location):  [facility] [place]     Platform used for Video Visit: [employer]     BILLING TIME DOCUMENTATION:   The total TIME spent on this patient on the date of the encounter/appointment was 44 minutes.      TOTAL TIME includes:   Time spent preparing to see the patient (reviewing records and tests)   Time spent face to face (or over the phone) with the patient   Time spent ordering tests, medications, procedures and referrals   Time spent Referring and communicating with other healthcare professionals  Time spent documenting clinical information in Epic

## 2021-05-11 ENCOUNTER — VIRTUAL VISIT (OUTPATIENT)
Dept: PALLIATIVE MEDICINE | Facility: CLINIC | Age: 31
End: 2021-05-11
Payer: COMMERCIAL

## 2021-05-11 DIAGNOSIS — M54.50 CHRONIC BILATERAL LOW BACK PAIN WITHOUT SCIATICA: Primary | ICD-10-CM

## 2021-05-11 DIAGNOSIS — G89.29 CHRONIC BILATERAL LOW BACK PAIN WITHOUT SCIATICA: Primary | ICD-10-CM

## 2021-05-11 DIAGNOSIS — M62.838 MUSCLE SPASM: ICD-10-CM

## 2021-05-11 PROCEDURE — 99215 OFFICE O/P EST HI 40 MIN: CPT | Mod: 95 | Performed by: NURSE PRACTITIONER

## 2021-05-11 RX ORDER — METHOCARBAMOL 500 MG/1
500-1000 TABLET, FILM COATED ORAL 3 TIMES DAILY PRN
Qty: 90 TABLET | Refills: 1 | Status: SHIPPED | OUTPATIENT
Start: 2021-05-11

## 2021-05-11 ASSESSMENT — PAIN SCALES - GENERAL: PAINLEVEL: MILD PAIN (3)

## 2021-05-11 NOTE — PATIENT INSTRUCTIONS
1.  Pain Physical Therapy:     YES   I highly recommend this resource and think you would be a great candidate. Order placed. Schedule first visit with Morenita Lou PT and try to have 2-3 sessions prior to next visit.   2.  Pain Psychologist to address relaxation, behavioral change, coping style, and other factors important to improvement.     May consider in the future but hold off on for now.    3.  Medication Management:     1. Okay to continue ibuprofen and Tylenol as needed.     2. Discontinue tizanidine and start Robaxin. Take 500-1000mg up to four times daily as needed. Monitor benefit.    3. If Robaxin doesn't provide significant pain relief we will likely consider Cymbalta as an option.      4.  Potential procedures: not at this time. May consider trigger point injections in the future.     5.  Referrals: I would recommend chiropractic care and acupuncture for a holistic approach to management of pain. Call to check coverage with insurance. See resources below.    6.  Follow up with MELVA Gibbons CNP in 4 weeks.     CHIROPRACTIC    [facility]  None at this time.    Outside of [facility]:  While we don't have specific Doctor of Chiropractic names or clinics to share with you, we want to offer you some tips in finding a Chiropractor.     ? We recommend seeking out a list from your insurance plan to ensure you have coverage with which providers, if that is important to you.    ? We recommend that you cross-reference the list for the location that is a fit for you.    ? Additionally, we recommend that you ask a trusted friend, family member or colleague if they have utilized a chiropractor in your area that they would recommend.  However, keep in mind that just like finding a primary care physician or a , you simply may need to shop around until you find one that is a fit for you.  ? Other resources:  ? [place] Chirop[employer]ic Association Directory  ? International Chiropractic  Pediatric Association - Find a chiropractor  ? [place] Board of Chiropractic    ACUPUNCTURE OPTIONS (outpatient)    [facility]  ? [facility]  Scheduling:    ? Call the [facility] at [phone]  Insurance Coverage:    ? Please check with your insurance plan to determine available coverage and benefits covered by a Licensed Acupuncturist, including, but not limited to out of pocket costs, conditions covered and visit limits  ? HSA and FSA are also accepted.  Availability:  ? Acupuncture appointments are available Monday-Friday.  ? A provider referral is required for all patients to be seen at the [facility]  ? Employees with [employer] insurance do not need a referral.     Intranet page:  [email or web address]    ? [facility] at [facility]- PEDIATRICS ONLY   Scheduling:  ? Call [facility] at [phone]   ? Schedule with Dr. Hanna Barnett. This is scheduled as part of an Integrative Medicine Consultation and is not scheduled as acupuncture-only at this time.     Insurance Coverage:  ? Please check with your insurance plan to determine available coverage and benefits covered by a physician, including, but not limited to out of pocket costs, conditions covered and visit limits  ? HSA and FSA are also accepted.  Availability:  ? Appointments are available Monday afternoons and Thursday mornings.  ? A provider referral may be required for  patients to be seen by Dr. Hanna Barnett depending on your insurance.    Intranet page:    Dr. Hanna Barnett - Integrative Medicine Consults    ? [facility]   Scheduling:    ? Only seeing staff at the [facility] at this time.    ? [facility]  Scheduling: Call [phone]  Insurance Coverage:    ? Please check with your insurance plan to determine available coverage and benefits  ? HSA and FSA are also accepted.  ? Cash pay available:  $124/session  Availability:  ? Acupuncture appointments are available Mondays and Wednesdays  ? Provider referral not required. Do not need to be a patient within Psychiatry to schedule.  ? [facility]  Scheduling: Call [phone]  Insurance Coverage:    ? Please check with your insurance plan to determine available coverage and benefits  ? HSA and FSA are also accepted  ? Cash pay available: $124/session  Availability:  ? Acupuncture appointments are available Tuesdays  ? Provider referral not required. Do not need to be a patient within Psychiatry to schedule.      Outside of [facility]:  While we don't have specific Acupuncturists names or clinics to share with you, we want to offer you some tips in finding an Acupuncturist.      ? In order to best find an Acupuncturist that is a fit for you, we recommend starting with this website:  [employer] - Find a practitioner  ? We recommend cross-referencing that list with your insurance plan to ensure you have coverage, if that is important to you, and with the list for the location that is a fit for you.   ? Additionally, we recommend that you ask a trusted friend, family member or colleague if they have utilized an Acupuncturist in your area that they would recommend.  However, keep in mind that just like finding a primary care physician or a , you simply may need to shop around until you find one that is a fit for you.         ----------------------------------------------------------------  Clinic Number:  [phone]     Call with any questions about your care and for scheduling assistance.     Calls are returned Monday through Friday between 8 AM and 4:30 PM. We usually get back to you within 2 business days depending on the issue/request.    If we are prescribing your medications:    For opioid medication refills, call the clinic or send a [employer] message 7 days in advance.  Please include:    Name of  requested medication    Name of the pharmacy.    For non-opioid medications, call your pharmacy directly to request a refill. Please allow 3-4 days to be processed.     Per [place] State Law:    All controlled substance prescriptions must be filled within 30 days of being written.      For those controlled substances allowing refills, pickup must occur within 30 days of last fill.      We believe regular attendance is key to your success in our program!      Any time you are unable to keep your appointment we ask that you call us at least 24 hours in advance to cancel.This will allow us to offer the appointment time to another patient.     Multiple missed appointments may lead to dismissal from the clinic.

## 2021-06-03 ENCOUNTER — OFFICE VISIT (OUTPATIENT)
Dept: PALLIATIVE MEDICINE | Facility: CLINIC | Age: 31
End: 2021-06-03
Payer: COMMERCIAL

## 2021-06-03 DIAGNOSIS — M62.838 MUSCLE SPASM: ICD-10-CM

## 2021-06-03 DIAGNOSIS — G89.29 CHRONIC BILATERAL LOW BACK PAIN WITHOUT SCIATICA: Primary | ICD-10-CM

## 2021-06-03 DIAGNOSIS — M54.50 CHRONIC BILATERAL LOW BACK PAIN WITHOUT SCIATICA: Primary | ICD-10-CM

## 2021-06-03 PROCEDURE — 97161 PT EVAL LOW COMPLEX 20 MIN: CPT | Mod: GP | Performed by: PHYSICAL THERAPIST

## 2021-06-03 PROCEDURE — 97112 NEUROMUSCULAR REEDUCATION: CPT | Mod: GP | Performed by: PHYSICAL THERAPIST

## 2021-06-03 NOTE — PROGRESS NOTES
PHYSICAL THERAPY INITIAL EVALUATION and PLAN OF CARE    Patient Name: Aisha Ramirez     : 1990    MRN: [id number]   Pain Management Provider:  MELVA Koenig CNP    Diagnosis:    Chronic bilateral low back pain without sciatica  Muscle spasm    PRESENTATION AND ETIOLOGY  Pain history:  Aisha Ramirez is a 30 year old female who presents for initial evaluation of chief complaint of low back pain.       She first started having problems with low back pain in 2016. 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 Her back pain has persisted since that time. She tried a Medrol dose pack. She completed physical therapy without significant benefit.  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 She completed another round of more intense physical therapy with increased strength but continued to have persistent pain. 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 Her pain has gradually worsened over the last year. She completed a third round off physical therapy in  with improvement in flexibility and strength. She continues a muscle relaxant with some benefit but has sedation with this. The pain is located in bilateral low back, left > right. The pain has radiated into her legs previously (down low back into her tailbone) but no longer does. Denies weakness.      Pain description:  Location: low back  Quality: burning  Severity/Intensity: 3/10 at best, 8/10 at worst, 3-4/10 on average  Aggravating factors include: sitting, quick movements/twisting  Relieving factors include: laying down, stretches, exercise     The patient otherwise denies bowel or bladder incontinence, parasthesias, weakness, saddle anesthesia, unintentional weight loss, or fever/chills/sweats.      Aisha Ramirez has not been  seen at a pain clinic in the past.       Pain Treatments:  (H--helped; HI--Helped initially; [facility]--Somewhat helpful; NH--No help; W--worse; SE--side effects; ?--Unsure if helpful)   1. Medications:       Current pain medications:              Tizanidine 4mg TID prn- [facility], usually takes about once a week, SE, drowsiness              Ibuprofen 800mg prn- [facility], sometimes daily while at work               Tylenol 1000mg prn- [facility], sometimes daily while at work      Current calculated MME: 0     1. Previous Pain Relevant Medications:  NOTE: This medication information taken from patient's intake form, not medical records.               Opiates: no              NSAIDS: ibuprofen- [facility]              Muscle Relaxants: tizanidine- [facility]              Anti-migraine mediations: no              Anti-depressants: no              Sleep aids: no              Anxiolytics: no              Neuropathics: no                       Topicals: no              Other medications not covered above: Tylenol-      2. Physical Therapy: x3 rounds of AD physical therapy- [facility] in strength/flexibility, foam rolling  3. Pain Psychology: no  4. Surgery: no  5. Injections: no  6. Chiropractic: no  7. Acupuncture: no  8. TENS Unit: yes- ?     LEVEL OF FUNCTION AT START OF CARE  Walking tolerance: Next  Sitting tolerance: 5-10'  Standing tolerance: next  Housework tolerance: next  Sleep: next    DEMOGRAPHICS  Employment Status: works for dispatch  Social Support: lives in [place] [facility], boyfriend  Pertinent Medical  / Surgical History: Epic Snapshot Reviewed, See provider's note    Patient's goals for physical therapy: to reduce average daily pain level from 3-4/10 to 0-1/10 and be able to resume high intensity exercise and biking, improve sitting tolerance    ===============================================================  SUBJECTIVE: reports consistent HEP (core strengthenig, flexibility) and active low intensity/impact cardio exercise as tolerated, no longer able  to perform high intensity exercise or ride bike as previous to her injury  Recent PT session included acupuncture which was very beneficial    OBJECTIVE:  POSTURE:  Observation: reduced upper thoracic kyphosis, mild increase lower thoracic kyphosis  Anterior pelvic tilt in standing; right anterior innominate with left SIJ hypomobility  Dominant WB on right ischium in sitting   GAIT, LOCOMOTION, and BALANCE:  Gait and Locomotion: normal velocity, gait pattern  RANGE OF MOTION:   Cervical: WFL, hypomobility C-T junction  Lumbar: WFL  Shoulders: next  Hips: next  MUSCLE PERFORMANCE:   Strength: demonstrates good core stability   Flexibility: tightness noted in left T-L psp, hip flexors, QL?  FUNCTIONAL TESTING/OBSERVATION: independent chair and bed mobility; mild left lumbar ms guarding with LTR to the right  Supine body scan dominant on left pelvis and right shoulder diagonal  Pain behaviors: None    SELF CARE:  Aerobic activity/Walking:next  HEP:  --Cliff stretch  --prone lat stretch with head rotation  --anthony/pu  Relaxation/Breathing: next  Mini-breaks: next  Posture: instructed in neutral sitting posture with feet supported and equal weight shift/distribution on pelvis  Pacing: next  Body Mechanics: next  Sensory calming / Pain Flare Plan: spiky/lacrosse ball MFR    ===============================================================  Today's Treatment:  Initial evaluation  Neuromuscular re-ed: For 30 minutes as above.  Focus next session: body scan, SL left shoulder/hip glides, consider rotation on roller, QL stretch, supine SB/with unilateral bridge (thoraic rotation)  ===============================================================  ASSESSMENT:  Physical Therapy Diagnosis: Impaired posture and muscle perfromance    Patient requires PT intervention for the following impairments: Impaired posture / muscle imbalance, Muscle flexibility limitations, Pain and Soft tissue mobility limitations    Anticipated Goals and  Expected Outcomes:  8 weeks  Patient will report the use of 2 self care practices during their day.  Patient will report the participation in 30 minutes of aerobic activity daily and practicing stretching daily.  Patient will demonstrate the ability to find core strength in neutral posture.  Patient will demonstrate the ability to relax muscle group before stretching.  16 weeks  Patient will be independent with a home exercise program.  Patient will be independent with posture correction.  Patient will report independence with a self care/flare management program.  Patient will demonstrate improved functional strength and endurance as reports by increased tolerance for IADLs and more consistent participation in daily activity.     Rehab potential for achieving goals: good.    ===============================================================  PLAN:   Patient will benefit from skilled physical therapy consisting of:  neuromuscular reeducation of: kinesthetic sense and posture for sitting and/or standing activities, education in self care / home management training to include instruction in: symptom control techniques, therapeutic activities to achieve improved functional performance in: daily actvities and therapeutic exercise to develop: strength and endurance, flexibility and core stability    Assessment will be ongoing with changes in treatment as indicated.  Benefits/risks/alternatives to treatment have been reviewed and the patient has been instructed to contact this office if they have any questions or concerns.  This plan of care has been discussed with the patient and the patient is in agreement.     Frequency / Duration:  Patient will be seen for a total of 6 visits; 12 weeks    Total Visit Time: 50  minutes            Morenita Lou, PT                                     Date:  6/3/2021      =====================================================  **  Referring Provider Certification: Referring Provider  reviewing certifies that the above treatment / plan of care is required and authorized, and that the patient's plan will be reviewed every thirty (30) days **.   ======================================================     PRESENT:  NA    MULTIDISCIPLINARY PATIENT / FAMILY EDUCATION RECORD  Department:  Physical Therapy    Readiness to Learn: Ability to understand verbal instructions, Ability to understand written instructions, Knowledge of educational needs / treatment plan  Specific Barriers to Learning: None  Referrals: None  Learning Needs: Rehabilitation techniques to improve functional independence Pain management education to improve daily activity tolerance.  Who: Patient  How: Demonstration, Verbal instructions, Written instructions  Response: Appropriate verbal response, Asked questions, Demonstrated ability, Verbalized recall / understanding

## 2021-06-17 ENCOUNTER — OFFICE VISIT (OUTPATIENT)
Dept: PALLIATIVE MEDICINE | Facility: CLINIC | Age: 31
End: 2021-06-17
Payer: COMMERCIAL

## 2021-06-17 DIAGNOSIS — M54.50 CHRONIC BILATERAL LOW BACK PAIN WITHOUT SCIATICA: Primary | ICD-10-CM

## 2021-06-17 DIAGNOSIS — M62.838 MUSCLE SPASM: ICD-10-CM

## 2021-06-17 DIAGNOSIS — G89.29 CHRONIC BILATERAL LOW BACK PAIN WITHOUT SCIATICA: Primary | ICD-10-CM

## 2021-06-17 PROCEDURE — 97112 NEUROMUSCULAR REEDUCATION: CPT | Mod: GP | Performed by: PHYSICAL THERAPIST

## 2021-06-17 NOTE — PROGRESS NOTES
PAIN PHYSICAL THERAPY PROGRESS NOTE  Patient Name: Aisha Ramirez      YOB: 1990     Medical Record Number: [id number]  Diagnosis:    Chronic bilateral low back pain without sciatica  Muscle spasm    Visit: 2/6     PRESENTATION AND ETIOLOGY  Pain history:  Aisha Ramirez is a 30 year old female who presents for initial evaluation of chief complaint of low back pain.       She first started having problems with low back pain in June of 2016. 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 Her back pain has persisted since that time. She tried a Medrol dose pack. She completed physical therapy without significant benefit.  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 She completed another round of more intense physical therapy with increased strength but continued to have persistent pain. 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 Her pain has gradually worsened over the last year. She completed a third round off physical therapy in 2020 with improvement in flexibility and strength. She continues a muscle relaxant with some benefit but has sedation with this. The pain is located in bilateral low back, left > right. The pain has radiated into her legs previously (down low back into her tailbone) but no longer does. Denies weakness.      Pain description:  Location: low back  Quality: burning  Severity/Intensity: 3/10 at best, 8/10 at worst, 3-4/10 on average  Aggravating factors include: sitting, quick movements/twisting  Relieving factors include: laying down, stretches, exercise     The patient otherwise denies bowel or bladder incontinence, parasthesias, weakness, saddle anesthesia, unintentional weight loss, or fever/chills/sweats.      Aisha Ramirez has not been seen at a pain clinic in the past.        Pain Treatments:  (H--helped; HI--Helped initially; [facility]--Somewhat helpful; NH--No help; W--worse; SE--side effects; ?--Unsure if helpful)   1. Medications:       Current pain medications:              Tizanidine 4mg TID prn- [facility], usually takes about once a week, SE, drowsiness              Ibuprofen 800mg prn- [facility], sometimes daily while at work               Tylenol 1000mg prn- [facility], sometimes daily while at work      Current calculated MME: 0     1. Previous Pain Relevant Medications:  NOTE: This medication information taken from patient's intake form, not medical records.               Opiates: no              NSAIDS: ibuprofen- [facility]              Muscle Relaxants: tizanidine- [facility]              Anti-migraine mediations: no              Anti-depressants: no              Sleep aids: no              Anxiolytics: no              Neuropathics: no                       Topicals: no              Other medications not covered above: Tylenol-      2. Physical Therapy: x3 rounds of AD physical therapy- [facility] in strength/flexibility, foam rolling  3. Pain Psychology: no  4. Surgery: no  5. Injections: no  6. Chiropractic: no  7. Acupuncture: no  8. TENS Unit: yes- ?     LEVEL OF FUNCTION AT START OF CARE  Walking tolerance: Next  Sitting tolerance: 5-10'  Standing tolerance: next  Housework tolerance: next  Sleep: next     DEMOGRAPHICS  Employment Status: works for dispatch  Social Support: lives in [place] [facility], boyfriend  Pertinent Medical  / Surgical History: Epic Snapshot Reviewed, See provider's note     Patient's goals for physical therapy: to reduce average daily pain level from 3-4/10 to 0-1/10 and be able to resume high intensity exercise and biking, improve sitting tolerance     ===============================================================  SUBJECTIVE: reports consistent HEP (core strengthenig, flexibility) and active low intensity/impact cardio exercise as tolerated, no longer able to perform high intensity  exercise or ride bike as previous to her injury  Recent PT session in [place] included acupuncture which was very beneficial  Today: notes muscle soreness along thoracic-lumbar paraspinals;      OBJECTIVE:  POSTURE:  Observation: reduced upper thoracic kyphosis, mild increase lower thoracic kyphosis  Anterior pelvic tilt in standing; right anterior innominate with left SIJ hypomobility  Dominant WB on right ischium in sitting   GAIT, LOCOMOTION, and BALANCE:  Gait and Locomotion: normal velocity, gait pattern  RANGE OF MOTION:   Cervical: WFL, hypomobility C-T junction  Lumbar: WFL  Shoulders: next  Hips: next  MUSCLE PERFORMANCE:   Strength: demonstrates good core stability   Flexibility: tightness noted in left T-L psp, hip flexors, QL?  FUNCTIONAL TESTING/OBSERVATION: independent chair and bed mobility; mild left lumbar ms guarding with LTR to the right  Supine body scan dominant on left pelvis and right shoulder diagonal  Pain behaviors: None     SELF CARE:  Aerobic activity/Walking:next  HEP:  --Cliff stretch  --prone lat stretch with head rotation  --anthony/pu  --SL QL stretch  --kneeling hip flexor stretch  --supine spinal rotation on roller    Relaxation/Breathing: next  Mini-breaks: next  Posture: instructed in neutral sitting posture with feet supported and equal weight shift/distribution on pelvis; neutral pelvis in standing  Pacing: next  Body Mechanics: next  Sensory calming / Pain Flare Plan: spiky/lacrosse ball MFR     Treatment Interventions:  Neuromuscular Reeducation:   For 45 minutes as above.  __________________________________________________________________    Assessment:  Ongoing Functional Limitations Include:  Patient tolerated/responded well to treatment    Intensity Level: 4 (1=low intensity; 5=high intensity)  Demonstrates/Verbalizes Technique: 5 (1= poor technique-difficulty performing exercises,significant cues required; 5= good technique-performs exercises without cues)  Body Awareness: 4  (1=low awareness; 5=high awareness)  Posture/Stability: 4 (1= poor posture, stability; 5= good posture, stability)  Motivational Level: Cooperative  Response to Teaching: cooperative  Factors that affect learning: None    _______________________________________________________________________  Plan of Care  Continue PT to support reactivation and integration of self regulation pain management skills;  Continue with prescribed plan of care - progress as tolerated.  Focus next session will be on: SL shoulder/hip glides, supine SB/with unilateral bridge, consider functional flexors     Present:  NA     Total Visit Time:  45 minutes    Therapist: Morenita Lou, PT           Date: 6/17/2021

## 2021-07-01 ENCOUNTER — OFFICE VISIT (OUTPATIENT)
Dept: PALLIATIVE MEDICINE | Facility: CLINIC | Age: 31
End: 2021-07-01
Payer: COMMERCIAL

## 2021-07-01 DIAGNOSIS — M62.838 MUSCLE SPASM: ICD-10-CM

## 2021-07-01 DIAGNOSIS — G89.29 CHRONIC BILATERAL LOW BACK PAIN WITHOUT SCIATICA: Primary | ICD-10-CM

## 2021-07-01 DIAGNOSIS — M54.50 CHRONIC BILATERAL LOW BACK PAIN WITHOUT SCIATICA: Primary | ICD-10-CM

## 2021-07-01 PROCEDURE — 97112 NEUROMUSCULAR REEDUCATION: CPT | Mod: GP | Performed by: PHYSICAL THERAPIST

## 2021-07-01 NOTE — PROGRESS NOTES
PAIN PHYSICAL THERAPY PROGRESS NOTE  Patient Name: Aisha Ramirez      YOB: 1990     Medical Record Number: [id number]  Diagnosis:    Chronic bilateral low back pain without sciatica  Muscle spasm    Visit: 2/6     PRESENTATION AND ETIOLOGY  Pain history:  Aisha Ramirez is a 30 year old female who presents for initial evaluation of chief complaint of low back pain.       She first started having problems with low back pain in June of 2016. 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 Her back pain has persisted since that time. She tried a Medrol dose pack. She completed physical therapy without significant benefit.  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 She completed another round of more intense physical therapy with increased strength but continued to have persistent pain. 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 Her pain has gradually worsened over the last year. She completed a third round off physical therapy in 2020 with improvement in flexibility and strength. She continues a muscle relaxant with some benefit but has sedation with this. The pain is located in bilateral low back, left > right. The pain has radiated into her legs previously (down low back into her tailbone) but no longer does. Denies weakness.      Pain description:  Location: low back  Quality: burning  Severity/Intensity: 3/10 at best, 8/10 at worst, 3-4/10 on average  Aggravating factors include: sitting, quick movements/twisting  Relieving factors include: laying down, stretches, exercise     The patient otherwise denies bowel or bladder incontinence, parasthesias, weakness, saddle anesthesia, unintentional weight loss, or fever/chills/sweats.      Aisha Ramirez has not been seen at a pain clinic in the past.        Pain Treatments:  (H--helped; HI--Helped initially; [facility]--Somewhat helpful; NH--No help; W--worse; SE--side effects; ?--Unsure if helpful)   1. Medications:       Current pain medications:              Tizanidine 4mg TID prn- [facility], usually takes about once a week, SE, drowsiness              Ibuprofen 800mg prn- [facility], sometimes daily while at work               Tylenol 1000mg prn- [facility], sometimes daily while at work      Current calculated MME: 0     1. Previous Pain Relevant Medications:  NOTE: This medication information taken from patient's intake form, not medical records.               Opiates: no              NSAIDS: ibuprofen- [facility]              Muscle Relaxants: tizanidine- [facility]              Anti-migraine mediations: no              Anti-depressants: no              Sleep aids: no              Anxiolytics: no              Neuropathics: no                       Topicals: no              Other medications not covered above: Tylenol-      2. Physical Therapy: x3 rounds of AD physical therapy- [facility] in strength/flexibility, foam rolling  3. Pain Psychology: no  4. Surgery: no  5. Injections: no  6. Chiropractic: no  7. Acupuncture: no  8. TENS Unit: yes- ?     LEVEL OF FUNCTION AT START OF CARE  Walking tolerance: Next  Sitting tolerance: 5-10'  Standing tolerance: next  Housework tolerance: next  Sleep: next     DEMOGRAPHICS  Employment Status: works for dispatch  Social Support: lives in [place], boyfriend  Pertinent Medical  / Surgical History: Epic Snapshot Reviewed, See provider's note     Patient's goals for physical therapy: to reduce average daily pain level from 3-4/10 to 0-1/10 and be able to resume high intensity exercise and biking, improve sitting tolerance     ===============================================================  SUBJECTIVE: reports consistent HEP (core strengthenig, flexibility) and active low intensity/impact cardio exercise as tolerated, no longer able to perform high intensity  "exercise or ride bike as previous to her injury  Recent PT session in [place] included acupuncture which was very beneficial  Today: feeling \"surprisingly well\" today; has been able to exercise daily; notes that long mountain hikes seem to help relieve back pain and tightness     OBJECTIVE:  POSTURE:  Observation: reduced upper thoracic kyphosis, mild increase lower thoracic kyphosis  Anterior pelvic tilt in standing; right anterior innominate with left SIJ hypomobility  Dominant WB on right ischium in sitting   GAIT, LOCOMOTION, and BALANCE:  Gait and Locomotion: normal velocity, gait pattern  RANGE OF MOTION:   Cervical: WFL, hypomobility C-T junction  Lumbar: WFL  Shoulders: next  Hips: next  MUSCLE PERFORMANCE:   Strength: demonstrates good core stability   Flexibility: tightness noted in left T-L psp, hip flexors, QL?  FUNCTIONAL TESTING/OBSERVATION: independent chair and bed mobility; mild left lumbar ms guarding with LTR to the right  Supine body scan dominant on left pelvis and right shoulder diagonal  Pain behaviors: None     SELF CARE:  Aerobic activity/Walking:next  HEP:  --Cliff stretch  --prone lat stretch with head rotation  --anthony/pu  --SL QL stretch  --kneeling hip flexor stretch  --supine spinal rotation on roller  --added dynamic diagonal lengthening in supine (pt noted feeling \"fatigue\" in psp along T-L junction)    Relaxation/Breathing: next  Mini-breaks: next  Posture: instructed in neutral sitting posture with feet supported and equal weight shift/distribution on pelvis; neutral pelvis in standing  Pacing: next  Body Mechanics: next  Sensory calming / Pain Flare Plan: spiky/lacrosse ball MFR, theracane     Treatment Interventions:  Neuromuscular Reeducation:   For 45 minutes as above.  __________________________________________________________________    Assessment:  Ongoing Functional Limitations Include:  Patient tolerated/responded well to treatment    Intensity Level: 4 (1=low intensity; " 5=high intensity)  Demonstrates/Verbalizes Technique: 5 (1= poor technique-difficulty performing exercises,significant cues required; 5= good technique-performs exercises without cues)  Body Awareness: 4 (1=low awareness; 5=high awareness)  Posture/Stability: 4 (1= poor posture, stability; 5= good posture, stability)  Motivational Level: Cooperative  Response to Teaching: cooperative  Factors that affect learning: None    _______________________________________________________________________  Plan of Care  Continue PT to support reactivation and integration of self regulation pain management skills;  Continue with prescribed plan of care - progress as tolerated.  Focus next session will be on: SL shoulder/hip glides, supine SB/with unilateral bridge, consider functional flexors, patient would like to hold on further Pain PT for now and schedule recheck with MELVA Koenig CNP       Present:  SHAY     Total Visit Time:  45 minutes    Therapist: Morenita Lou, PT           Date: 7/1/2021

## 2021-08-12 ENCOUNTER — VIRTUAL VISIT (OUTPATIENT)
Dept: PALLIATIVE MEDICINE | Facility: CLINIC | Age: 31
End: 2021-08-12
Payer: COMMERCIAL

## 2021-08-12 DIAGNOSIS — M62.838 MUSCLE SPASM: ICD-10-CM

## 2021-08-12 DIAGNOSIS — G89.29 CHRONIC BILATERAL LOW BACK PAIN WITHOUT SCIATICA: Primary | ICD-10-CM

## 2021-08-12 DIAGNOSIS — M54.50 CHRONIC BILATERAL LOW BACK PAIN WITHOUT SCIATICA: Primary | ICD-10-CM

## 2021-08-12 PROCEDURE — 99213 OFFICE O/P EST LOW 20 MIN: CPT | Mod: 95 | Performed by: NURSE PRACTITIONER

## 2021-08-12 ASSESSMENT — PAIN SCALES - GENERAL: PAINLEVEL: MILD PAIN (3)

## 2021-08-12 NOTE — PROGRESS NOTES
Aisha is a 30 year old who is being evaluated via a billable video visit.    Is Pt currently in [place]? Yes    How would you like to obtain your AVS? MyChart  If the video visit is dropped, the invitation should be resent by:   Will anyone else be joining your video visit? KATLYN Bryant [facility]   Pain Management Center      Video Start Time: 9:11 AM    [facility] Pain Management     Date of visit: 8/12/2021      Assessment:   Aisha Ramirez is a 30 year old female with a past medical history significant for lichen sclerosus who presents with complaints of low back pain .      1. Low back pain- etiology unclear, no abnormalities noted on imaging.   2. Mental Health - the patient's mental health concerns, specifically situational depression, affect her experience of pain and contribute to her clinically significant distress.    Visit Diagnoses:  1. Chronic bilateral low back pain without sciatica    2. Muscle spasm        Plan:            1.  Pain Physical Therapy:                YES  Pain was doing better for a while, thought to be related to progress made in physical therapy. Given recent worsening pain, advised she have another few sessions with Morenita Lou PT and continue to practice stretches and exercises on a regular basis.               2.  Pain Psychologist to address relaxation, behavioral change, coping style, and other factors important to improvement.                May consider in the future but hold off on for now.               3.  Medication Management:                           1. Okay to continue ibuprofen and Tylenol as needed.                           2. Robaxin prescribed at last visit but she was never contacted to pick it up so did not start. She will discontinue tizanidine and try Robaxin. Take 500-1000mg up to QID prn. Monitor benefit.                          3. If Robaxin doesn't provide significant pain relief we will likely consider Cymbalta as an  option at next visit.                 4.  Potential procedures: not at this time. May consider trigger point injections in the future.                5.  Referrals: I still would recommend chiropractic care and acupuncture for a holistic approach to management of pain. She is awaiting on approval from worker's comp. She will let me know if I can help support this.               6.  Follow up with MELVA Gibbons CNP in 6-8 weeks.     Kimberly FRYE CNP  [facility] Pain Management     -------------------------------------------------    Subjective:    Chief complaint:   Chief Complaint   Patient presents with     Pain       Interval history:  Aisha Ramirez is a 30 year old female last seen on 5/11/2021.  She is seen in follow up.     Recommendations/plan at the last visit included:              1.  Pain Physical Therapy:                YES  Nany has complete three rounds of physical therapy already with improvement in strength and ROM. She has not yet tried a chronic pain approach and I think that she would benefit. She is interested. Order placed. Schedule first visit with Morenita Lou PT and try to have 2-3 sessions prior to next visit.              2.  Pain Psychologist to address relaxation, behavioral change, coping style, and other factors important to improvement.                May consider in the future but hold off on for now.               3.  Medication Management:                           1. Okay to continue ibuprofen and Tylenol as needed as these medications have been somewhat helpful.                            2. Nany prefers to limit the role of medication management if possible. Tizanidine has been somewhat effective but sedating. Advised she discontinue tizanidine and try Robaxin. Take 500-1000mg up to QID prn. Monitor benefit.                          3. If Robaxin doesn't provide significant pain relief we will likely consider Cymbalta as an option.                 4.   "Potential procedures: not at this time. May consider trigger point injections in the future.                5.  Referrals: I would recommend chiropractic care and acupuncture for a holistic approach to management of pain. Call to check coverage with insurance. See resources below.               6.  Follow up with MELVA Gibbons CNP in 4 weeks.        Since her last visit, Aisha ANDREA Main reports:  -Her pain is somewhat worse than it was at last visit.   -She reports that her pain was doing quite well for a while but then has been worsening over the last week.   -She attributes the partial improvement in pain to be due working with a physical therapy in [place] and then pain physical therapy.   -She had x3 visits with pain physical therapy with Morenita Lou PT. \"I really like it, shes fantastic.\" Unclear what has worsened the pain over the last week. She would be interested in a follow up session.   -She hasn't tried Robaxin yet. States she forgot that it was prescribed and wasn't notified that it was ready. She continues tizanidine a couple days a week or so with some benefit, the sedation is limiting.   -She hasn't been able to start chiropractic care and acupuncture as this hasn't been approved by worker's comp. She has a  that is involved.     Pain Information:   Pain rating: averages 3/10 on a 0-10 scale.      Current pain medications:               Tizanidine 4mg TID prn- [facility], usually takes about 1-3x a week, SE, drowsiness              Ibuprofen 800mg prn- [facility], sometimes daily while at work               Tylenol 1000mg prn- [facility], sometimes daily while at work     Current MME: 0    Review of [place] Prescription Monitoring Program (): No concern for abuse or misuse of controlled medications based on this report.     Annual Controlled Substance Agreement/UDS due date: NA    Past pain treatments:  1. Previous Pain Relevant Medications:              Opiates: no              NSAIDS: ibuprofen- " [facility]              Muscle Relaxants: tizanidine- [facility]              Anti-migraine mediations: no              Anti-depressants: no              Sleep aids: no              Anxiolytics: no              Neuropathics: no                       Topicals: no              Other medications not covered above: Tylenol-      2. Physical Therapy: x3 rounds of AD physical therapy- [facility] in strength/flexibility, foam rolling  3. Pain Psychology: no  4. Surgery: no  5. Injections: no  6. Chiropractic: no  7. Acupuncture: no  8. TENS Unit: yes- ?       Medications:  Current Outpatient Medications   Medication Sig Dispense Refill     levonorgestrel (MIRENA) 20 MCG/24HR IUD 1 each (20 mcg) by Intrauterine route once for 1 dose 1 each 0     triamcinolone (KENALOG) 0.1 % external ointment Apply topically as needed for irritation Once to twice weekly 30 g 5     methocarbamol (ROBAXIN) 500 MG tablet Take 1-2 tablets (500-1,000 mg) by mouth 3 times daily as needed for muscle spasms (Patient not taking: Reported on 8/12/2021) 90 tablet 1       Medical History: any changes in medical history since they were last seen? No    Review of Systems: A 10-point review of systems was negative, with the exception of chronic pain issues.      Objective:    Physical Exam:  not currently breastfeeding.  Constitutional: Well developed, well nourished, appears stated age.  HEENT: Head atraumatic, normocephalic. Eyes without conjunctival injection or jaundice. Oropharynx clear. Neck supple. No obvious neck masses.  Skin: No rash, lesions, or petechiae of exposed skin.   Psychiatric/mental status: Alert, without lethargy or stupor. Speech fluent. Appropriate affect. Mood normal. Able to follow commands without difficulty.     Imaging:  MRI of lumbar spine was completed on 3/1/2021 and shows:  T12-L1: Normal.     L1-L2: Normal.     L2-L3: Normal.     L3-L4: Normal.     L4-L5: Normal.     L5-S1:  Normal.                                                                      IMPRESSION: Normal lumbar spine MRI examination.      Video-Visit Details    Type of service:  Video Visit    Video End Time:9:24 AM    Originating Location (pt. Location): Home    Distant Location (provider location):  [facility] PAIN MANAGEMENT [place]     Platform used for Video Visit: [employer] CHANG TIME DOCUMENTATION:   The total TIME spent on this patient on the date of the encounter/appointment was 23 minutes.      TOTAL TIME includes:   Time spent preparing to see the patient (reviewing records and tests)   Time spent face to face (or over the phone) with the patient   Time spent ordering tests, medications, procedures and referrals   Time spent Referring and communicating with other healthcare professionals   Time spent documenting clinical information in Epic

## 2021-08-12 NOTE — PATIENT INSTRUCTIONS
1.  Pain Physical Therapy:                YES  Have another few sessions with Morenita Lou PT and continue to practice stretches and exercises on a regular basis.               2.  Pain Psychologist to address relaxation, behavioral change, coping style, and other factors important to improvement.                May consider in the future but hold off on for now.               3.  Medication Management:                           1. Okay to continue ibuprofen and Tylenol as needed.                           2. Discontinue tizanidine and try Robaxin. Take 500-1000mg up to four times daily prn. Monitor benefit. Prescription should be waiting at [facility].                           3. If Robaxin doesn't provide significant pain relief we will likely consider Cymbalta as an option at next visit.                 4.  Potential procedures: not at this time. May consider trigger point injections in the future.                5.  Referrals: I still would recommend chiropractic care and acupuncture for a holistic approach to management of pain. Let me know if I can help support this.               6.  Follow up with MELVA Gibbons CNP in 6-8 weeks.     ----------------------------------------------------------------  Clinic Number:  [phone]     Call with any questions about your care and for scheduling assistance.     Calls are returned Monday through Friday between 8 AM and 4:30 PM. We usually get back to you within 2 business days depending on the issue/request.    If we are prescribing your medications:    For opioid medication refills, call the clinic or send a [employer] message 7 days in advance.  Please include:    Name of requested medication    Name of the pharmacy.    For non-opioid medications, call your pharmacy directly to request a refill. Please allow 3-4 days to be processed.     Per [place] State Law:    All controlled substance prescriptions must be filled within 30 days of being written.      For  those controlled substances allowing refills, pickup must occur within 30 days of last fill.      We believe regular attendance is key to your success in our program!      Any time you are unable to keep your appointment we ask that you call us at least 24 hours in advance to cancel.This will allow us to offer the appointment time to another patient.     Multiple missed appointments may lead to dismissal from the clinic.

## 2021-10-04 ENCOUNTER — HEALTH MAINTENANCE LETTER (OUTPATIENT)
Age: 31
End: 2021-10-04

## 2021-11-04 ENCOUNTER — OFFICE VISIT (OUTPATIENT)
Dept: FAMILY MEDICINE | Facility: CLINIC | Age: 31
End: 2021-11-04
Payer: COMMERCIAL

## 2021-11-04 VITALS
TEMPERATURE: 99 F | BODY MASS INDEX: 23.71 KG/M2 | DIASTOLIC BLOOD PRESSURE: 67 MMHG | SYSTOLIC BLOOD PRESSURE: 114 MMHG | HEART RATE: 99 BPM | WEIGHT: 123.8 LBS | OXYGEN SATURATION: 99 %

## 2021-11-04 DIAGNOSIS — Z23 HIGH PRIORITY FOR 2019-NCOV VACCINE: Primary | ICD-10-CM

## 2021-11-04 DIAGNOSIS — Z56.6 STRESS AT WORK: ICD-10-CM

## 2021-11-04 PROCEDURE — 0004A COVID-19,PF,PFIZER (12+ YRS): CPT | Performed by: PHYSICIAN ASSISTANT

## 2021-11-04 PROCEDURE — 91300 COVID-19,PF,PFIZER (12+ YRS): CPT | Performed by: PHYSICIAN ASSISTANT

## 2021-11-04 PROCEDURE — 99213 OFFICE O/P EST LOW 20 MIN: CPT | Mod: 25 | Performed by: PHYSICIAN ASSISTANT

## 2021-11-04 SDOH — HEALTH STABILITY - MENTAL HEALTH: OTHER PHYSICAL AND MENTAL STRAIN RELATED TO WORK: Z56.6

## 2021-11-04 NOTE — PROGRESS NOTES
Assessment & Plan     High priority for 2019-nCoV vaccine  - COVID-19,PF,PFIZER (12+ Yrs PURPLE LABEL)    Stress at work  We discussed that this stress does not necessarily qualify under FMLA. I have advised her to seek out her em[employer]ee assistance program for help navigating her job, but also with her plans to continue school and able to do other ADL's not appropriate for FMLA.         23 minutes spent on the date of the encounter doing chart review, history and exam, documentation and further activities per the note           No follow-ups on file.    Maryellen Elias PA-C  [facility] BERNARD Leonard is a 31 year old who presents for the following health issues     HPI     She was hoping for a leave of absence from work for a bit.   The environment is not great.   She can't do her stretches anymore.   Tension with her boss because of her pain.   She notes they are short staffed and if she tries to do it through work they will deny it.   Has been getting anxious and angry when at work.   Has not done any counseling.   Dispatching with [facility].   Has taken on more with school and bought a house. Not taking a break from school.   She is feeling overwhelmed.   Plans on leaving job but does not want to do it without notice.       Review of Systems   Constitutional, HEENT, cardiovascular, pulmonary, gi and gu systems are negative, except as otherwise noted.      Objective    /67 (BP Location: Right arm, Patient Position: Chair, Cuff Size: Adult Regular)   Pulse 99   Temp 99  F (37.2  C) (Oral)   Wt 56.2 kg (123 lb 12.8 oz)   SpO2 99%   Breastfeeding No   BMI 23.71 kg/m    Body mass index is 23.71 kg/m .  Physical Exam   GENERAL: healthy, alert and no distress  PSYCH: mentation appears normal, affect normal/bright

## 2021-11-04 NOTE — PATIENT INSTRUCTIONS
Phone: [phone] or toll free [phone]  Email: [email or web address]

## 2022-01-23 ENCOUNTER — HEALTH MAINTENANCE LETTER (OUTPATIENT)
Age: 32
End: 2022-01-23

## 2022-05-06 ENCOUNTER — TRANSFERRED RECORDS (OUTPATIENT)
Dept: HEALTH INFORMATION MANAGEMENT | Facility: CLINIC | Age: 32
End: 2022-05-06

## 2022-05-06 LAB
HPV ABSTRACT: NORMAL
PAP-ABSTRACT: NORMAL

## 2022-09-11 ENCOUNTER — HEALTH MAINTENANCE LETTER (OUTPATIENT)
Age: 32
End: 2022-09-11

## 2023-07-29 ENCOUNTER — HEALTH MAINTENANCE LETTER (OUTPATIENT)
Age: 33
End: 2023-07-29

## 2024-09-21 ENCOUNTER — HEALTH MAINTENANCE LETTER (OUTPATIENT)
Age: 34
End: 2024-09-21